# Patient Record
Sex: FEMALE | Race: WHITE | NOT HISPANIC OR LATINO | URBAN - METROPOLITAN AREA
[De-identification: names, ages, dates, MRNs, and addresses within clinical notes are randomized per-mention and may not be internally consistent; named-entity substitution may affect disease eponyms.]

---

## 2021-03-12 ENCOUNTER — OFFICE (OUTPATIENT)
Dept: URBAN - METROPOLITAN AREA CLINIC 102 | Facility: CLINIC | Age: 61
End: 2021-03-12

## 2021-03-12 PROBLEM — Z86.010 PERSONAL HISTORY OF COLON POLYPS: Status: ACTIVE | Noted: 2021-03-12

## 2021-03-12 PROCEDURE — 00038: CPT | Performed by: INTERNAL MEDICINE

## 2021-03-30 ENCOUNTER — OFFICE (OUTPATIENT)
Dept: URBAN - METROPOLITAN AREA CLINIC 34 | Facility: CLINIC | Age: 61
End: 2021-03-30
Payer: COMMERCIAL

## 2021-03-30 DIAGNOSIS — Z11.59 ENCOUNTER FOR SCREENING FOR OTHER VIRAL DISEASES: ICD-10-CM

## 2021-03-30 PROCEDURE — 99211 OFF/OP EST MAY X REQ PHY/QHP: CPT | Mod: CS,25 | Performed by: INTERNAL MEDICINE

## 2021-03-30 PROCEDURE — 99211 OFF/OP EST MAY X REQ PHY/QHP: CPT | Mod: 25,CS | Performed by: INTERNAL MEDICINE

## 2021-04-02 ENCOUNTER — OFFICE (OUTPATIENT)
Dept: URBAN - METROPOLITAN AREA CLINIC 98 | Facility: CLINIC | Age: 61
End: 2021-04-02

## 2021-04-02 ENCOUNTER — OFFICE (OUTPATIENT)
Dept: URBAN - METROPOLITAN AREA CLINIC 98 | Facility: CLINIC | Age: 61
End: 2021-04-02
Payer: COMMERCIAL

## 2021-04-02 VITALS
DIASTOLIC BLOOD PRESSURE: 25 MMHG | RESPIRATION RATE: 14 BRPM | DIASTOLIC BLOOD PRESSURE: 84 MMHG | OXYGEN SATURATION: 96 % | SYSTOLIC BLOOD PRESSURE: 118 MMHG | SYSTOLIC BLOOD PRESSURE: 118 MMHG | RESPIRATION RATE: 17 BRPM | SYSTOLIC BLOOD PRESSURE: 107 MMHG | SYSTOLIC BLOOD PRESSURE: 125 MMHG | RESPIRATION RATE: 14 BRPM | DIASTOLIC BLOOD PRESSURE: 82 MMHG | TEMPERATURE: 97.3 F | TEMPERATURE: 97.2 F | DIASTOLIC BLOOD PRESSURE: 84 MMHG | TEMPERATURE: 97.2 F | SYSTOLIC BLOOD PRESSURE: 114 MMHG | DIASTOLIC BLOOD PRESSURE: 85 MMHG | OXYGEN SATURATION: 97 % | SYSTOLIC BLOOD PRESSURE: 123 MMHG | SYSTOLIC BLOOD PRESSURE: 95 MMHG | RESPIRATION RATE: 16 BRPM | DIASTOLIC BLOOD PRESSURE: 82 MMHG | OXYGEN SATURATION: 95 % | DIASTOLIC BLOOD PRESSURE: 25 MMHG | HEART RATE: 65 BPM | OXYGEN SATURATION: 99 % | RESPIRATION RATE: 19 BRPM | TEMPERATURE: 97.3 F | HEART RATE: 64 BPM | HEART RATE: 67 BPM | SYSTOLIC BLOOD PRESSURE: 123 MMHG | SYSTOLIC BLOOD PRESSURE: 114 MMHG | HEART RATE: 66 BPM | DIASTOLIC BLOOD PRESSURE: 86 MMHG | SYSTOLIC BLOOD PRESSURE: 113 MMHG | OXYGEN SATURATION: 99 % | SYSTOLIC BLOOD PRESSURE: 122 MMHG | HEART RATE: 70 BPM | DIASTOLIC BLOOD PRESSURE: 81 MMHG | SYSTOLIC BLOOD PRESSURE: 113 MMHG | SYSTOLIC BLOOD PRESSURE: 91 MMHG | RESPIRATION RATE: 8 BRPM | RESPIRATION RATE: 16 BRPM | OXYGEN SATURATION: 98 % | SYSTOLIC BLOOD PRESSURE: 107 MMHG | HEIGHT: 62 IN | DIASTOLIC BLOOD PRESSURE: 58 MMHG | SYSTOLIC BLOOD PRESSURE: 125 MMHG | HEART RATE: 66 BPM | SYSTOLIC BLOOD PRESSURE: 122 MMHG | RESPIRATION RATE: 19 BRPM | DIASTOLIC BLOOD PRESSURE: 81 MMHG | WEIGHT: 143 LBS | DIASTOLIC BLOOD PRESSURE: 80 MMHG | OXYGEN SATURATION: 98 % | RESPIRATION RATE: 18 BRPM | HEART RATE: 71 BPM | SYSTOLIC BLOOD PRESSURE: 117 MMHG | DIASTOLIC BLOOD PRESSURE: 86 MMHG | SYSTOLIC BLOOD PRESSURE: 117 MMHG | OXYGEN SATURATION: 92 % | HEIGHT: 62 IN | OXYGEN SATURATION: 95 % | DIASTOLIC BLOOD PRESSURE: 58 MMHG | RESPIRATION RATE: 8 BRPM | DIASTOLIC BLOOD PRESSURE: 80 MMHG | DIASTOLIC BLOOD PRESSURE: 77 MMHG | RESPIRATION RATE: 17 BRPM | HEART RATE: 71 BPM | WEIGHT: 143 LBS | HEART RATE: 67 BPM | SYSTOLIC BLOOD PRESSURE: 91 MMHG | SYSTOLIC BLOOD PRESSURE: 95 MMHG | OXYGEN SATURATION: 92 % | HEART RATE: 63 BPM | HEART RATE: 64 BPM | RESPIRATION RATE: 18 BRPM | HEART RATE: 70 BPM | DIASTOLIC BLOOD PRESSURE: 85 MMHG | OXYGEN SATURATION: 97 % | HEART RATE: 63 BPM | HEART RATE: 65 BPM | DIASTOLIC BLOOD PRESSURE: 77 MMHG | OXYGEN SATURATION: 96 %

## 2021-04-02 DIAGNOSIS — Z86.010 PERSONAL HISTORY OF COLONIC POLYPS: ICD-10-CM

## 2021-04-02 DIAGNOSIS — K62.1 RECTAL POLYP: ICD-10-CM

## 2021-04-02 DIAGNOSIS — K63.5 POLYP OF COLON: ICD-10-CM

## 2021-04-02 DIAGNOSIS — D12.2 BENIGN NEOPLASM OF ASCENDING COLON: ICD-10-CM

## 2021-04-02 DIAGNOSIS — D12.3 BENIGN NEOPLASM OF TRANSVERSE COLON: ICD-10-CM

## 2021-04-02 LAB
GI LOWER POLYPECTOMY EXCISION - SPECM 1: CLINICAL INFORMATION: (no result)
GI LOWER POLYPECTOMY EXCISION - SPECM 1: CLINICAL INFORMATION: (no result)
GI LOWER POLYPECTOMY EXCISION - SPECM 1: CLINICAL OBSERVATIONS: (no result)
GI LOWER POLYPECTOMY EXCISION - SPECM 1: CLINICAL OBSERVATIONS: (no result)
GI LOWER POLYPECTOMY EXCISION - SPECM 1: CPT CODES: (no result)
GI LOWER POLYPECTOMY EXCISION - SPECM 1: CPT CODES: (no result)
GI LOWER POLYPECTOMY EXCISION - SPECM 1: DIAGNOSIS: (no result)
GI LOWER POLYPECTOMY EXCISION - SPECM 1: DIAGNOSIS: (no result)
GI LOWER POLYPECTOMY EXCISION - SPECM 1: GROSS DESCRIPTION: (no result)
GI LOWER POLYPECTOMY EXCISION - SPECM 1: GROSS DESCRIPTION: (no result)
GI LOWER POLYPECTOMY EXCISION - SPECM 1: INCOMING ICD CODE(S): (no result)
GI LOWER POLYPECTOMY EXCISION - SPECM 1: INCOMING ICD CODE(S): (no result)
GI LOWER POLYPECTOMY EXCISION - SPECM 1: MICROSCOPIC DESCRIPTION: (no result)
GI LOWER POLYPECTOMY EXCISION - SPECM 1: MICROSCOPIC DESCRIPTION: (no result)
GI LOWER POLYPECTOMY EXCISION - SPECM 1: PATHOLOGIST: (no result)
GI LOWER POLYPECTOMY EXCISION - SPECM 1: PATHOLOGIST: (no result)
GI LOWER POLYPECTOMY EXCISION - SPECM 1: REPORT TITLE: (no result)
GI LOWER POLYPECTOMY EXCISION - SPECM 1: REPORT TITLE: (no result)
GI LOWER POLYPECTOMY EXCISION - SPECM 1: SPECIMEN COMMENT: (no result)
GI LOWER POLYPECTOMY EXCISION - SPECM 1: SPECIMEN COMMENT: (no result)
GI LOWER POLYPECTOMY EXCISION - SPECM 1: SPECIMEN SOURCE: (no result)
GI LOWER POLYPECTOMY EXCISION - SPECM 1: SPECIMEN SOURCE: (no result)
GI LOWER POLYPECTOMY EXCISION - SPECM 1: SYNOPSIS: (no result)
GI LOWER POLYPECTOMY EXCISION - SPECM 1: SYNOPSIS: (no result)
GI LOWER POLYPECTOMY EXCISION - SPECM 2: CLINICAL INFORMATION: (no result)
GI LOWER POLYPECTOMY EXCISION - SPECM 2: CLINICAL INFORMATION: (no result)
GI LOWER POLYPECTOMY EXCISION - SPECM 2: CLINICAL OBSERVATIONS: (no result)
GI LOWER POLYPECTOMY EXCISION - SPECM 2: CLINICAL OBSERVATIONS: (no result)
GI LOWER POLYPECTOMY EXCISION - SPECM 2: CPT CODES: (no result)
GI LOWER POLYPECTOMY EXCISION - SPECM 2: CPT CODES: (no result)
GI LOWER POLYPECTOMY EXCISION - SPECM 2: DIAGNOSIS: (no result)
GI LOWER POLYPECTOMY EXCISION - SPECM 2: DIAGNOSIS: (no result)
GI LOWER POLYPECTOMY EXCISION - SPECM 2: GROSS DESCRIPTION: (no result)
GI LOWER POLYPECTOMY EXCISION - SPECM 2: GROSS DESCRIPTION: (no result)
GI LOWER POLYPECTOMY EXCISION - SPECM 2: INCOMING ICD CODE(S): (no result)
GI LOWER POLYPECTOMY EXCISION - SPECM 2: INCOMING ICD CODE(S): (no result)
GI LOWER POLYPECTOMY EXCISION - SPECM 2: MICROSCOPIC DESCRIPTION: (no result)
GI LOWER POLYPECTOMY EXCISION - SPECM 2: MICROSCOPIC DESCRIPTION: (no result)
GI LOWER POLYPECTOMY EXCISION - SPECM 2: PATHOLOGIST: (no result)
GI LOWER POLYPECTOMY EXCISION - SPECM 2: PATHOLOGIST: (no result)
GI LOWER POLYPECTOMY EXCISION - SPECM 2: REPORT TITLE: (no result)
GI LOWER POLYPECTOMY EXCISION - SPECM 2: REPORT TITLE: (no result)
GI LOWER POLYPECTOMY EXCISION - SPECM 2: SPECIMEN COMMENT: (no result)
GI LOWER POLYPECTOMY EXCISION - SPECM 2: SPECIMEN COMMENT: (no result)
GI LOWER POLYPECTOMY EXCISION - SPECM 2: SPECIMEN SOURCE: (no result)
GI LOWER POLYPECTOMY EXCISION - SPECM 2: SPECIMEN SOURCE: (no result)
GI LOWER POLYPECTOMY EXCISION - SPECM 2: SYNOPSIS: (no result)
GI LOWER POLYPECTOMY EXCISION - SPECM 2: SYNOPSIS: (no result)
GI LOWER POLYPECTOMY EXCISION - SPECM 3: CLINICAL INFORMATION: (no result)
GI LOWER POLYPECTOMY EXCISION - SPECM 3: CLINICAL INFORMATION: (no result)
GI LOWER POLYPECTOMY EXCISION - SPECM 3: CLINICAL OBSERVATIONS: (no result)
GI LOWER POLYPECTOMY EXCISION - SPECM 3: CLINICAL OBSERVATIONS: (no result)
GI LOWER POLYPECTOMY EXCISION - SPECM 3: CPT CODES: (no result)
GI LOWER POLYPECTOMY EXCISION - SPECM 3: CPT CODES: (no result)
GI LOWER POLYPECTOMY EXCISION - SPECM 3: DIAGNOSIS: (no result)
GI LOWER POLYPECTOMY EXCISION - SPECM 3: DIAGNOSIS: (no result)
GI LOWER POLYPECTOMY EXCISION - SPECM 3: GROSS DESCRIPTION: (no result)
GI LOWER POLYPECTOMY EXCISION - SPECM 3: GROSS DESCRIPTION: (no result)
GI LOWER POLYPECTOMY EXCISION - SPECM 3: INCOMING ICD CODE(S): (no result)
GI LOWER POLYPECTOMY EXCISION - SPECM 3: INCOMING ICD CODE(S): (no result)
GI LOWER POLYPECTOMY EXCISION - SPECM 3: MICROSCOPIC DESCRIPTION: (no result)
GI LOWER POLYPECTOMY EXCISION - SPECM 3: MICROSCOPIC DESCRIPTION: (no result)
GI LOWER POLYPECTOMY EXCISION - SPECM 3: PATHOLOGIST: (no result)
GI LOWER POLYPECTOMY EXCISION - SPECM 3: PATHOLOGIST: (no result)
GI LOWER POLYPECTOMY EXCISION - SPECM 3: REPORT TITLE: (no result)
GI LOWER POLYPECTOMY EXCISION - SPECM 3: REPORT TITLE: (no result)
GI LOWER POLYPECTOMY EXCISION - SPECM 3: SPECIMEN COMMENT: (no result)
GI LOWER POLYPECTOMY EXCISION - SPECM 3: SPECIMEN COMMENT: (no result)
GI LOWER POLYPECTOMY EXCISION - SPECM 3: SPECIMEN SOURCE: (no result)
GI LOWER POLYPECTOMY EXCISION - SPECM 3: SPECIMEN SOURCE: (no result)
GI LOWER POLYPECTOMY EXCISION - SPECM 3: SYNOPSIS: (no result)
GI LOWER POLYPECTOMY EXCISION - SPECM 3: SYNOPSIS: (no result)
GI LOWER POLYPECTOMY EXCISION - SPECM 4: CLINICAL INFORMATION: (no result)
GI LOWER POLYPECTOMY EXCISION - SPECM 4: CLINICAL INFORMATION: (no result)
GI LOWER POLYPECTOMY EXCISION - SPECM 4: CLINICAL OBSERVATIONS: (no result)
GI LOWER POLYPECTOMY EXCISION - SPECM 4: CLINICAL OBSERVATIONS: (no result)
GI LOWER POLYPECTOMY EXCISION - SPECM 4: CPT CODES: (no result)
GI LOWER POLYPECTOMY EXCISION - SPECM 4: CPT CODES: (no result)
GI LOWER POLYPECTOMY EXCISION - SPECM 4: DIAGNOSIS: (no result)
GI LOWER POLYPECTOMY EXCISION - SPECM 4: DIAGNOSIS: (no result)
GI LOWER POLYPECTOMY EXCISION - SPECM 4: GROSS DESCRIPTION: (no result)
GI LOWER POLYPECTOMY EXCISION - SPECM 4: GROSS DESCRIPTION: (no result)
GI LOWER POLYPECTOMY EXCISION - SPECM 4: INCOMING ICD CODE(S): (no result)
GI LOWER POLYPECTOMY EXCISION - SPECM 4: INCOMING ICD CODE(S): (no result)
GI LOWER POLYPECTOMY EXCISION - SPECM 4: MICROSCOPIC DESCRIPTION: (no result)
GI LOWER POLYPECTOMY EXCISION - SPECM 4: MICROSCOPIC DESCRIPTION: (no result)
GI LOWER POLYPECTOMY EXCISION - SPECM 4: PATHOLOGIST: (no result)
GI LOWER POLYPECTOMY EXCISION - SPECM 4: PATHOLOGIST: (no result)
GI LOWER POLYPECTOMY EXCISION - SPECM 4: REPORT TITLE: (no result)
GI LOWER POLYPECTOMY EXCISION - SPECM 4: REPORT TITLE: (no result)
GI LOWER POLYPECTOMY EXCISION - SPECM 4: SPECIMEN COMMENT: (no result)
GI LOWER POLYPECTOMY EXCISION - SPECM 4: SPECIMEN COMMENT: (no result)
GI LOWER POLYPECTOMY EXCISION - SPECM 4: SPECIMEN SOURCE: (no result)
GI LOWER POLYPECTOMY EXCISION - SPECM 4: SPECIMEN SOURCE: (no result)
GI LOWER POLYPECTOMY EXCISION - SPECM 4: SYNOPSIS: (no result)
GI LOWER POLYPECTOMY EXCISION - SPECM 4: SYNOPSIS: (no result)
Lab: (no result)
Lab: (no result)

## 2021-04-02 PROCEDURE — 00811 ANES LWR INTST NDSC NOS: CPT | Mod: AA,P1,QS

## 2021-04-02 PROCEDURE — 00811 ANES LWR INTST NDSC NOS: CPT | Mod: AA,QS,P1

## 2021-09-03 ENCOUNTER — OFFICE (OUTPATIENT)
Dept: URBAN - METROPOLITAN AREA CLINIC 102 | Facility: CLINIC | Age: 61
End: 2021-09-03

## 2021-09-03 PROCEDURE — 00038: CPT | Performed by: INTERNAL MEDICINE

## 2021-10-12 ENCOUNTER — OFFICE (OUTPATIENT)
Dept: URBAN - METROPOLITAN AREA CLINIC 98 | Facility: CLINIC | Age: 61
End: 2021-10-12
Payer: COMMERCIAL

## 2021-10-12 VITALS
HEART RATE: 68 BPM | HEART RATE: 65 BPM | SYSTOLIC BLOOD PRESSURE: 131 MMHG | HEART RATE: 69 BPM | DIASTOLIC BLOOD PRESSURE: 85 MMHG | DIASTOLIC BLOOD PRESSURE: 87 MMHG | SYSTOLIC BLOOD PRESSURE: 127 MMHG | HEIGHT: 62 IN | SYSTOLIC BLOOD PRESSURE: 135 MMHG | HEART RATE: 64 BPM | DIASTOLIC BLOOD PRESSURE: 89 MMHG | RESPIRATION RATE: 18 BRPM | DIASTOLIC BLOOD PRESSURE: 76 MMHG | SYSTOLIC BLOOD PRESSURE: 121 MMHG | OXYGEN SATURATION: 91 % | WEIGHT: 144 LBS | HEART RATE: 71 BPM | OXYGEN SATURATION: 99 % | RESPIRATION RATE: 16 BRPM | DIASTOLIC BLOOD PRESSURE: 77 MMHG | SYSTOLIC BLOOD PRESSURE: 125 MMHG | OXYGEN SATURATION: 97 % | OXYGEN SATURATION: 95 % | TEMPERATURE: 97.5 F | DIASTOLIC BLOOD PRESSURE: 86 MMHG | OXYGEN SATURATION: 98 % | DIASTOLIC BLOOD PRESSURE: 80 MMHG | TEMPERATURE: 97.4 F | SYSTOLIC BLOOD PRESSURE: 138 MMHG

## 2021-10-12 DIAGNOSIS — K63.5 POLYP OF COLON: ICD-10-CM

## 2021-10-12 DIAGNOSIS — K57.30 DIVERTICULOSIS OF LARGE INTESTINE WITHOUT PERFORATION OR ABS: ICD-10-CM

## 2021-10-12 DIAGNOSIS — Z86.010 PERSONAL HISTORY OF COLONIC POLYPS: ICD-10-CM

## 2021-10-12 LAB
GI LOWER POLYPECTOMY EXCISION - SPECM 1: CLINICAL INFORMATION: (no result)
GI LOWER POLYPECTOMY EXCISION - SPECM 1: CLINICAL OBSERVATIONS: (no result)
GI LOWER POLYPECTOMY EXCISION - SPECM 1: CPT CODES: (no result)
GI LOWER POLYPECTOMY EXCISION - SPECM 1: DIAGNOSIS: (no result)
GI LOWER POLYPECTOMY EXCISION - SPECM 1: GROSS DESCRIPTION: (no result)
GI LOWER POLYPECTOMY EXCISION - SPECM 1: INCOMING ICD CODE(S): (no result)
GI LOWER POLYPECTOMY EXCISION - SPECM 1: MICROSCOPIC DESCRIPTION: (no result)
GI LOWER POLYPECTOMY EXCISION - SPECM 1: PATHOLOGIST: (no result)
GI LOWER POLYPECTOMY EXCISION - SPECM 1: REPORT TITLE: (no result)
GI LOWER POLYPECTOMY EXCISION - SPECM 1: SPECIMEN COMMENT: (no result)
GI LOWER POLYPECTOMY EXCISION - SPECM 1: SPECIMEN SOURCE: (no result)
GI LOWER POLYPECTOMY EXCISION - SPECM 1: SYNOPSIS: (no result)
GI LOWER POLYPECTOMY EXCISION - SPECM 2: CLINICAL INFORMATION: (no result)
GI LOWER POLYPECTOMY EXCISION - SPECM 2: CLINICAL OBSERVATIONS: (no result)
GI LOWER POLYPECTOMY EXCISION - SPECM 2: CPT CODES: (no result)
GI LOWER POLYPECTOMY EXCISION - SPECM 2: DIAGNOSIS: (no result)
GI LOWER POLYPECTOMY EXCISION - SPECM 2: GROSS DESCRIPTION: (no result)
GI LOWER POLYPECTOMY EXCISION - SPECM 2: INCOMING ICD CODE(S): (no result)
GI LOWER POLYPECTOMY EXCISION - SPECM 2: MICROSCOPIC DESCRIPTION: (no result)
GI LOWER POLYPECTOMY EXCISION - SPECM 2: PATHOLOGIST: (no result)
GI LOWER POLYPECTOMY EXCISION - SPECM 2: PDF REPORT: (no result)
GI LOWER POLYPECTOMY EXCISION - SPECM 2: REPORT TITLE: (no result)
GI LOWER POLYPECTOMY EXCISION - SPECM 2: SPECIMEN COMMENT: (no result)
GI LOWER POLYPECTOMY EXCISION - SPECM 2: SPECIMEN SOURCE: (no result)
GI LOWER POLYPECTOMY EXCISION - SPECM 2: SYNOPSIS: (no result)

## 2022-07-13 ENCOUNTER — TELEHEALTH PROVIDED IN PATIENT'S HOME (OUTPATIENT)
Dept: URBAN - METROPOLITAN AREA TELEHEALTH 12 | Facility: TELEHEALTH | Age: 62
End: 2022-07-13

## 2022-07-13 VITALS — WEIGHT: 145 LBS | HEIGHT: 62 IN

## 2022-07-13 DIAGNOSIS — Z79.1 LONG TERM (CURRENT) USE OF NON-STEROIDAL ANTI-INFLAMMATORIES: ICD-10-CM

## 2022-07-13 DIAGNOSIS — K21.9 GASTRO-ESOPHAGEAL REFLUX DISEASE WITHOUT ESOPHAGITIS: ICD-10-CM

## 2022-07-13 DIAGNOSIS — R10.12 LEFT UPPER QUADRANT PAIN: ICD-10-CM

## 2022-07-13 PROCEDURE — 99214 OFFICE O/P EST MOD 30 MIN: CPT | Mod: 95

## 2022-07-13 NOTE — SERVICEHPINOTES
PATIENT VERIFIED BY DATE OF BIRTH AND NAME. Patient has been consented for this telecommunication visit.
br

br61 y/o female presents for evaluation of "abdominal pain". Patient reports this has been ongoing for several weeks started about the time that she was doing physical therapy for neck pain, dx disc herniation. Notes a "tightening sensation" that starts in the back and moves to left upper abdomen. She emphasizes "not pain", does not correlate with meals and is intermittent. No aggravating or alleviating factors. She endorses heartburn but this is not frequent knows her triggers well and just avoids them. Patient admits to frequent NSAID use for msk pains, 400 mg Ibuprofen at least once a day was also taking Meloxicam but stopped when abd discomfort started. No tobacco. Drinks wine every night, 1 glass.
br 
--U/S 6/2022: focused exam of LUQ unremarkable.br
br Her bowel habits are overall regular. She can experience some diarrhea with high fat foods but otherwise normal form, color. No blood in stool. Weight stable. No fevers, chills, night sweats.
br --Colonoscopy 10/2021: external and internal hemorrhoids, mild diverticulosis, 2 polyps (bx hyperplastic) R/c 3 yrs.
br
Ketty 10 point review of systems have been reviewed as per HPI and otherwise negative. br

## 2025-01-23 ENCOUNTER — OFFICE (OUTPATIENT)
Dept: URBAN - METROPOLITAN AREA CLINIC 102 | Facility: CLINIC | Age: 65
End: 2025-01-23
Payer: COMMERCIAL

## 2025-01-23 VITALS
DIASTOLIC BLOOD PRESSURE: 97 MMHG | TEMPERATURE: 97.7 F | HEART RATE: 76 BPM | WEIGHT: 155 LBS | HEIGHT: 61 IN | SYSTOLIC BLOOD PRESSURE: 132 MMHG

## 2025-01-23 DIAGNOSIS — I71.21 ANEURYSM OF THE ASCENDING AORTA, WITHOUT RUPTURE: ICD-10-CM

## 2025-01-23 DIAGNOSIS — K59.09 OTHER CONSTIPATION: ICD-10-CM

## 2025-01-23 PROCEDURE — 99213 OFFICE O/P EST LOW 20 MIN: CPT | Performed by: INTERNAL MEDICINE

## 2025-02-04 ENCOUNTER — OFFICE (OUTPATIENT)
Dept: URBAN - METROPOLITAN AREA CLINIC 98 | Facility: CLINIC | Age: 65
End: 2025-02-04

## 2025-02-04 VITALS
OXYGEN SATURATION: 95 % | HEART RATE: 79 BPM | OXYGEN SATURATION: 94 % | RESPIRATION RATE: 13 BRPM | SYSTOLIC BLOOD PRESSURE: 147 MMHG | SYSTOLIC BLOOD PRESSURE: 152 MMHG | TEMPERATURE: 97.3 F | HEART RATE: 70 BPM | TEMPERATURE: 97.7 F | OXYGEN SATURATION: 93 % | DIASTOLIC BLOOD PRESSURE: 105 MMHG | DIASTOLIC BLOOD PRESSURE: 101 MMHG | WEIGHT: 150 LBS | DIASTOLIC BLOOD PRESSURE: 99 MMHG | HEART RATE: 78 BPM | OXYGEN SATURATION: 96 % | DIASTOLIC BLOOD PRESSURE: 86 MMHG | HEART RATE: 82 BPM | OXYGEN SATURATION: 91 % | OXYGEN SATURATION: 98 % | SYSTOLIC BLOOD PRESSURE: 153 MMHG | HEIGHT: 61 IN | SYSTOLIC BLOOD PRESSURE: 162 MMHG | DIASTOLIC BLOOD PRESSURE: 111 MMHG | SYSTOLIC BLOOD PRESSURE: 154 MMHG | HEART RATE: 77 BPM | DIASTOLIC BLOOD PRESSURE: 109 MMHG | SYSTOLIC BLOOD PRESSURE: 142 MMHG | RESPIRATION RATE: 16 BRPM

## 2025-02-04 DIAGNOSIS — Z86.0101 PERSONAL HISTORY OF ADENOMATOUS AND SERRATED COLON POLYPS: ICD-10-CM

## 2025-02-04 DIAGNOSIS — Z09 ENCOUNTER FOR FOLLOW-UP EXAMINATION AFTER COMPLETED TREATMEN: ICD-10-CM
